# Patient Record
Sex: FEMALE | Race: WHITE
[De-identification: names, ages, dates, MRNs, and addresses within clinical notes are randomized per-mention and may not be internally consistent; named-entity substitution may affect disease eponyms.]

---

## 2018-03-02 ENCOUNTER — HOSPITAL ENCOUNTER (OUTPATIENT)
Dept: HOSPITAL 92 - NM | Age: 68
Discharge: HOME | End: 2018-03-02
Attending: INTERNAL MEDICINE
Payer: COMMERCIAL

## 2018-03-02 DIAGNOSIS — R11.2: Primary | ICD-10-CM

## 2018-03-02 PROCEDURE — A9541 TC99M SULFUR COLLOID: HCPCS

## 2018-03-02 PROCEDURE — 78264 GASTRIC EMPTYING IMG STUDY: CPT

## 2018-03-02 NOTE — NM
RADIONUCLIDE GASTRIC EMPTYING EXAM:

 

HISTORY: 

Nausea and vomiting.

 

FINDINGS: 

Scrambled eggs were used.  Early images show good mixing of radiotracer within the stomach.  Half-lif
e emptying is calculated at 39 minutes.

 

Percent emptying is as follows:

 

TIME          % EMPTYING

30 minutes        50%

1 hour            67%

2 hours           81%

3 hours           99%

 

IMPRESSION: 

Normal radionuclide gastric emptying exam.

 

POS: [facility]

## 2018-05-08 ENCOUNTER — HOSPITAL ENCOUNTER (OUTPATIENT)
Dept: HOSPITAL 92 - BICRAD | Age: 68
Discharge: HOME | End: 2018-05-08
Attending: INTERNAL MEDICINE
Payer: MEDICARE

## 2018-05-08 DIAGNOSIS — D72.1: Primary | ICD-10-CM

## 2018-05-08 DIAGNOSIS — M43.8X6: ICD-10-CM

## 2018-05-08 PROCEDURE — 71046 X-RAY EXAM CHEST 2 VIEWS: CPT

## 2018-05-09 ENCOUNTER — HOSPITAL ENCOUNTER (OUTPATIENT)
Dept: HOSPITAL 92 - TBSIIMAG | Age: 68
Discharge: HOME | End: 2018-05-09
Attending: NEUROLOGICAL SURGERY
Payer: COMMERCIAL

## 2018-05-09 DIAGNOSIS — M54.2: Primary | ICD-10-CM

## 2018-05-09 PROCEDURE — 72040 X-RAY EXAM NECK SPINE 2-3 VW: CPT

## 2018-05-09 NOTE — RAD
FIVE VIEW CERVICAL SPINE:

 

History: Cervical fusion. Follow up exam. Cervicalgia. 

 

FINDINGS: 

There is no prevertebral soft tissue swelling. Predental space is normal. Lateral masses of C1 and C2
 articulate appropriately. The tip of the odontoid process is obscured on the open mouth projection. 
The remainder of the odontoid process is unremarkable. 

 

On the AP projection, mild facet hypertrophy is noted. Anterior fusion plate with transvertebral body
 screws identified at C4, C5, C6 and C7. No perihardware lucency. Disc prosthesis at C4-5, C5-6, and 
C6-7. 

 

There appears to be overall decrease in vertebral body height, possibly due to resorptive changes at 
C4. Evaluation is limited. CT would be beneficial. The C5 and C6 vertebral bodies are unremarkable. L
imited evaluation of C7 vertebral body and the cervicothoracic junction, even with swimmer's view. Gr
ossly, no abnormality. 

 

IMPRESSION: 

Abnormal appearance of the C4 vertebral body. Better interrogation with CT is recommended. 

 

POS: [facility]

## 2018-11-27 ENCOUNTER — HOSPITAL ENCOUNTER (OUTPATIENT)
Dept: HOSPITAL 18 - NAV DTY OP | Age: 68
Discharge: HOME | End: 2018-11-27
Payer: COMMERCIAL

## 2018-11-27 DIAGNOSIS — E11.29: Primary | ICD-10-CM

## 2018-11-27 PROCEDURE — 97802 MEDICAL NUTRITION INDIV IN: CPT
